# Patient Record
Sex: FEMALE | Race: WHITE | Employment: OTHER | URBAN - NONMETROPOLITAN AREA
[De-identification: names, ages, dates, MRNs, and addresses within clinical notes are randomized per-mention and may not be internally consistent; named-entity substitution may affect disease eponyms.]

---

## 2017-01-01 ENCOUNTER — COMMUNICATION - GICH (OUTPATIENT)
Dept: FAMILY MEDICINE | Facility: OTHER | Age: 82
End: 2017-01-01

## 2017-01-01 ENCOUNTER — HISTORY (OUTPATIENT)
Dept: FAMILY MEDICINE | Facility: OTHER | Age: 82
End: 2017-01-01

## 2017-01-01 ENCOUNTER — AMBULATORY - GICH (OUTPATIENT)
Dept: SCHEDULING | Facility: OTHER | Age: 82
End: 2017-01-01

## 2017-01-01 ENCOUNTER — HISTORY (OUTPATIENT)
Dept: EMERGENCY MEDICINE | Facility: OTHER | Age: 82
End: 2017-01-01

## 2017-01-01 ENCOUNTER — HISTORY (OUTPATIENT)
Dept: INTERNAL MEDICINE | Facility: OTHER | Age: 82
End: 2017-01-01

## 2017-01-01 ENCOUNTER — OFFICE VISIT - GICH (OUTPATIENT)
Dept: INTERNAL MEDICINE | Facility: OTHER | Age: 82
End: 2017-01-01

## 2017-01-01 ENCOUNTER — OFFICE VISIT - GICH (OUTPATIENT)
Dept: FAMILY MEDICINE | Facility: OTHER | Age: 82
End: 2017-01-01

## 2017-01-01 ENCOUNTER — COMMUNICATION - GICH (OUTPATIENT)
Dept: PHYSICAL THERAPY | Facility: OTHER | Age: 82
End: 2017-01-01

## 2017-01-01 ENCOUNTER — COMMUNICATION - GICH (OUTPATIENT)
Dept: INTERNAL MEDICINE | Facility: OTHER | Age: 82
End: 2017-01-01

## 2017-01-01 ENCOUNTER — HOSPITAL ENCOUNTER (OUTPATIENT)
Dept: RADIOLOGY | Facility: OTHER | Age: 82
End: 2017-11-28
Attending: NURSE PRACTITIONER

## 2017-01-01 DIAGNOSIS — F51.04 PSYCHOPHYSIOLOGIC INSOMNIA: ICD-10-CM

## 2017-01-01 DIAGNOSIS — M94.9 DISORDER OF CARTILAGE: ICD-10-CM

## 2017-01-01 DIAGNOSIS — I10 ESSENTIAL (PRIMARY) HYPERTENSION: ICD-10-CM

## 2017-01-01 DIAGNOSIS — M89.9 DISORDER OF BONE: ICD-10-CM

## 2017-01-01 DIAGNOSIS — R29.6 REPEATED FALLS: ICD-10-CM

## 2017-01-01 DIAGNOSIS — K59.1 FUNCTIONAL DIARRHEA: ICD-10-CM

## 2017-01-01 DIAGNOSIS — M54.50 LOW BACK PAIN: ICD-10-CM

## 2017-01-01 DIAGNOSIS — G25.81 RESTLESS LEGS SYNDROME: ICD-10-CM

## 2017-01-01 DIAGNOSIS — G47.00 INSOMNIA: ICD-10-CM

## 2017-01-01 DIAGNOSIS — R60.9 EDEMA: ICD-10-CM

## 2017-01-01 DIAGNOSIS — K30 FUNCTIONAL DYSPEPSIA: ICD-10-CM

## 2017-01-01 DIAGNOSIS — R05.9 COUGH: ICD-10-CM

## 2017-01-01 DIAGNOSIS — I35.0 NONRHEUMATIC AORTIC VALVE STENOSIS: ICD-10-CM

## 2017-01-01 DIAGNOSIS — H54.3 UNQUALIFIED VISUAL LOSS, BOTH EYES: ICD-10-CM

## 2017-01-01 DIAGNOSIS — I50.1 LEFT VENTRICULAR FAILURE (H): ICD-10-CM

## 2017-01-01 DIAGNOSIS — E11.9 TYPE 2 DIABETES MELLITUS WITHOUT COMPLICATIONS (H): ICD-10-CM

## 2017-01-01 DIAGNOSIS — G89.29 OTHER CHRONIC PAIN: ICD-10-CM

## 2017-01-01 DIAGNOSIS — R68.89 OTHER GENERAL SYMPTOMS AND SIGNS: ICD-10-CM

## 2017-01-01 DIAGNOSIS — R53.1 WEAKNESS: ICD-10-CM

## 2017-01-01 DIAGNOSIS — M24.542 CONTRACTURE OF JOINT OF LEFT HAND: ICD-10-CM

## 2017-01-01 DIAGNOSIS — F01.50 VASCULAR DEMENTIA WITHOUT BEHAVIORAL DISTURBANCE (H): ICD-10-CM

## 2017-01-01 DIAGNOSIS — F41.1 GENERALIZED ANXIETY DISORDER: ICD-10-CM

## 2017-01-01 DIAGNOSIS — G24.3 SPASMODIC TORTICOLLIS: ICD-10-CM

## 2017-01-01 LAB
A/G RATIO - HISTORICAL: 0.9 (ref 1–2)
ABSOLUTE BASOPHILS - HISTORICAL: 0.2 THOU/CU MM
ABSOLUTE EOSINOPHILS - HISTORICAL: 0.8 THOU/CU MM
ABSOLUTE LYMPHOCYTES - HISTORICAL: 0.7 THOU/CU MM (ref 0.9–2.9)
ABSOLUTE MONOCYTES - HISTORICAL: 1.2 THOU/CU MM
ABSOLUTE NEUTROPHILS - HISTORICAL: 7.3 THOU/CU MM (ref 1.7–7)
ALBUMIN SERPL-MCNC: 3 G/DL (ref 3.5–5.7)
ALP SERPL-CCNC: 111 IU/L (ref 34–104)
ALT (SGPT) - HISTORICAL: 5 IU/L (ref 7–52)
ANION GAP - HISTORICAL: 11 (ref 5–18)
ANION GAP - HISTORICAL: 7 (ref 5–18)
AST SERPL-CCNC: 8 IU/L (ref 13–39)
BASOPHILS # BLD AUTO: 1.8 %
BILIRUB SERPL-MCNC: 0.3 MG/DL (ref 0.3–1)
BNP SERPL-MCNC: 52 PG/ML
BUN SERPL-MCNC: 16 MG/DL (ref 7–25)
BUN SERPL-MCNC: 29 MG/DL (ref 7–25)
BUN/CREAT RATIO - HISTORICAL: 20
BUN/CREAT RATIO - HISTORICAL: 26
CALCIUM SERPL-MCNC: 11.2 MG/DL (ref 8.6–10.3)
CALCIUM SERPL-MCNC: 9.4 MG/DL (ref 8.6–10.3)
CHLORIDE SERPLBLD-SCNC: 99 MMOL/L (ref 98–107)
CHLORIDE SERPLBLD-SCNC: 99 MMOL/L (ref 98–107)
CO2 SERPL-SCNC: 25 MMOL/L (ref 21–31)
CO2 SERPL-SCNC: 25 MMOL/L (ref 21–31)
CREAT SERPL-MCNC: 0.81 MG/DL (ref 0.7–1.3)
CREAT SERPL-MCNC: 1.13 MG/DL (ref 0.7–1.3)
EOSINOPHIL NFR BLD AUTO: 8.2 %
ERYTHROCYTE [DISTWIDTH] IN BLOOD BY AUTOMATED COUNT: 11.2 % (ref 11.5–15.5)
ERYTHROCYTE [DISTWIDTH] IN BLOOD BY AUTOMATED COUNT: 12.6 % (ref 11.5–15.5)
ESTIMATED AVERAGE GLUCOSE: 134 MG/DL
GFR IF NOT AFRICAN AMERICAN - HISTORICAL: 45 ML/MIN/1.73M2
GFR IF NOT AFRICAN AMERICAN - HISTORICAL: >60 ML/MIN/1.73M2
GLOBULIN - HISTORICAL: 3.5 G/DL (ref 2–3.7)
GLUCOSE SERPL-MCNC: 107 MG/DL (ref 70–105)
GLUCOSE SERPL-MCNC: 147 MG/DL (ref 70–105)
HCT VFR BLD AUTO: 30.8 % (ref 33–51)
HCT VFR BLD AUTO: 36.8 % (ref 33–51)
HEMOGLOBIN A1C MONITORING (POCT) - HISTORICAL: 6.3 % (ref 4–6.2)
HEMOGLOBIN: 10.5 G/DL (ref 12–16)
HEMOGLOBIN: 12.7 G/DL (ref 12–16)
LYMPHOCYTES NFR BLD AUTO: 6.6 % (ref 20–44)
MCH RBC QN AUTO: 31.1 PG (ref 26–34)
MCH RBC QN AUTO: 31.2 PG (ref 26–34)
MCHC RBC AUTO-ENTMCNC: 34.2 G/DL (ref 32–36)
MCHC RBC AUTO-ENTMCNC: 34.5 G/DL (ref 32–36)
MCV RBC AUTO: 90 FL (ref 80–100)
MCV RBC AUTO: 91 FL (ref 80–100)
MONOCYTES NFR BLD AUTO: 11.5 %
NEUTROPHILS NFR BLD AUTO: 71.8 % (ref 42–72)
PLATELET # BLD AUTO: 264 THOU/CU MM (ref 140–440)
PLATELET # BLD AUTO: 330 THOU/CU MM (ref 140–440)
PMV BLD: 6.5 FL (ref 6.5–11)
PMV BLD: 9.9 FL (ref 6.5–11)
POTASSIUM SERPL-SCNC: 4.2 MMOL/L (ref 3.5–5.1)
POTASSIUM SERPL-SCNC: 4.4 MMOL/L (ref 3.5–5.1)
PROT SERPL-MCNC: 6.5 G/DL (ref 6.4–8.9)
RED BLOOD COUNT - HISTORICAL: 3.38 MIL/CU MM (ref 4–5.2)
RED BLOOD COUNT - HISTORICAL: 4.09 MIL/CU MM (ref 4–5.2)
SODIUM SERPL-SCNC: 131 MMOL/L (ref 133–143)
SODIUM SERPL-SCNC: 135 MMOL/L (ref 133–143)
WHITE BLOOD COUNT - HISTORICAL: 10.2 THOU/CU MM (ref 4.5–11)
WHITE BLOOD COUNT - HISTORICAL: 12.3 THOU/CU MM (ref 4.5–11)

## 2017-01-01 ASSESSMENT — PATIENT HEALTH QUESTIONNAIRE - PHQ9: SUM OF ALL RESPONSES TO PHQ QUESTIONS 1-9: 24

## 2017-01-05 ENCOUNTER — OFFICE VISIT - GICH (OUTPATIENT)
Dept: OBGYN | Facility: OTHER | Age: 82
End: 2017-01-05

## 2017-01-05 DIAGNOSIS — Z46.89 ENCOUNTER FOR FITTING AND ADJUSTMENT OF OTHER SPECIFIED DEVICES: ICD-10-CM

## 2017-01-06 ENCOUNTER — COMMUNICATION - GICH (OUTPATIENT)
Dept: FAMILY MEDICINE | Facility: OTHER | Age: 82
End: 2017-01-06

## 2017-01-06 ENCOUNTER — COMMUNICATION - GICH (OUTPATIENT)
Dept: OBGYN | Facility: OTHER | Age: 82
End: 2017-01-06

## 2017-01-23 ENCOUNTER — COMMUNICATION - GICH (OUTPATIENT)
Dept: FAMILY MEDICINE | Facility: OTHER | Age: 82
End: 2017-01-23

## 2017-01-23 DIAGNOSIS — F51.04 PSYCHOPHYSIOLOGIC INSOMNIA: ICD-10-CM

## 2017-02-17 ENCOUNTER — COMMUNICATION - GICH (OUTPATIENT)
Dept: FAMILY MEDICINE | Facility: OTHER | Age: 82
End: 2017-02-17

## 2017-02-17 DIAGNOSIS — G25.81 RESTLESS LEGS SYNDROME: ICD-10-CM

## 2017-02-17 DIAGNOSIS — I10 ESSENTIAL (PRIMARY) HYPERTENSION: ICD-10-CM

## 2017-02-28 ENCOUNTER — COMMUNICATION - GICH (OUTPATIENT)
Dept: FAMILY MEDICINE | Facility: OTHER | Age: 82
End: 2017-02-28

## 2017-02-28 DIAGNOSIS — I10 ESSENTIAL (PRIMARY) HYPERTENSION: ICD-10-CM

## 2017-03-06 ENCOUNTER — COMMUNICATION - GICH (OUTPATIENT)
Dept: FAMILY MEDICINE | Facility: OTHER | Age: 82
End: 2017-03-06

## 2017-03-10 ENCOUNTER — COMMUNICATION - GICH (OUTPATIENT)
Dept: FAMILY MEDICINE | Facility: OTHER | Age: 82
End: 2017-03-10

## 2017-03-10 DIAGNOSIS — G25.81 RESTLESS LEGS SYNDROME: ICD-10-CM

## 2017-03-24 ENCOUNTER — COMMUNICATION - GICH (OUTPATIENT)
Dept: FAMILY MEDICINE | Facility: OTHER | Age: 82
End: 2017-03-24

## 2017-03-24 DIAGNOSIS — K59.00 CONSTIPATION: ICD-10-CM

## 2017-03-24 DIAGNOSIS — F51.04 PSYCHOPHYSIOLOGIC INSOMNIA: ICD-10-CM

## 2017-12-27 NOTE — PROGRESS NOTES
Patient Information     Patient Name MRN Sex Melita Maldonado [id number] Female 1927      Progress Notes by Jenn Johansen NP at 2017  8:20 AM     Author:  Jenn Johansen NP Service:  (none) Author Type:  PHYS- Nurse Practitioner     Filed:  2017  9:02 AM Encounter Date:  2017 Status:  Signed     :  Jenn Johansen NP (PHYS- Nurse Practitioner)            SUBJECTIVE:    Melita Laws is a 89 y.o. female who presents for cough and to establish care    HPI  patient has had a cough for the past 2 weeks according to her son and . She lives at assisted living. They have used over-the-counter cough syrup without relief. The cough can occur if she is sitting or lying back. It does not come with meals or liquids. She has been afebrile. She has congestive heart failure and is on Lasix and Cozaar. No edema or weight gain. She is full assist with transfers. They are needing a mechanical lift ordered. Also has known hypertension that has been very well controlled. They would like to discontinue as many medications as possible. She is unable to swallow pills so her medications need to be crushed. She is on amitriptyline which she has taken for years for depression and this is working well. She sleeps well. She is also on melatonin. She has history of type 2 diabetes. Last A1c was 6.7% in 2015. She does not have sugars checked any longer. She is on a diabetic diet. They suspect she has had multiple strokes in the past. She has never really had any diagnostic studies but by her clinical history this is been observed. She does have some mild dementia.  Allergies     Allergen  Reactions     Haldol [Haloperidol] Anxiety     Beta Blockers [Beta-Blockers (Beta-Adrenergic Blocking Agts)] Nightmares     Lisinopril Cough   ,   Family History       Problem   Relation Age of Onset     Cancer-breast  Mother       age 81 of breast cancer       Cancer  Father        age 96 of unknown type of cancer       Heart Disease  Father      Silent MI       Other  Father      Dementia       Diabetes  Sister      Osteoporosis  Sister      Osteoporosis  Sister      Heart Disease  Sister      Valve repair X2       Psychiatric illness  Son      Depression       Diabetes  Other      Psychiatric illness  Neg.      Negative for anxiety       Psychiatric illness  Neg.      Negative for Dementia       Cancer-colon  Neg.      Negative for Colon-Cancer     ,   Current Outpatient Prescriptions on File Prior to Visit       Medication  Sig Dispense Refill     amitriptyline (ELAVIL) 10 mg tablet Take 1 tablet by mouth at bedtime. 30 tablet 11     cholestyramine-sucrose 4 G per scoop (QUESTRAN) 4 gram powder Take 1 Packet by mouth 2 times daily. 180 Packet 4     furosemide (LASIX) 20 mg tablet TAKE 1 TABLET BY MOUTH EVERY OTHER DAY (IN THE MORNING) 48 tablet 3     lidocaine HCl 4 % topical cream Use on patient's neck and left hand twice a day prn 1 Bottle 5     losartan (COZAAR) 50 mg tablet Take 1 tablet by mouth once daily. 90 tablet 3     medication order composer Manual wheel chair with foot pedals 1 unit 0     medication order composer 1:1:1 Diaper rash cream.1 oz ketoconazole cream,1 oz constant care cream ( or equivalent) 1 oz Maalox;  tid for 7 days 3 oz 2     Melatonin 5 mg subl TAKE 1 TABLET BY MOUTH AT BEDTIME 33 Tab 8     primidone (MYSOLINE) 50 mg tablet TAKE 1/2 TABLET BY MOUTH THREE TIMES DAILY (AM,5PM,HS) 90 tablet 5     sulindac (CLINORIL) 150 mg tablet Take 1 tablet by mouth 2 times daily with meals. 180 tablet 3     No current facility-administered medications on file prior to visit.    ,   Past Medical History:     Diagnosis  Date     Basal cell cancer, left leg 2015     inactive icd-9 diagnosis auto replaced with icd-10, display name retained//mporz      Cataract right     Cystocele      DM (diabetes mellitus) (HC)     diet controlled, since       Hx of pregnancy     G4,  P4, A0      Migraine     Aborted migraines - no pain; auras one time a year (none since 2009)      NEOPLASM, SKIN, UNCERTAIN BEHAVIOR 9/29/2011    right pinna       Spasmodic torticollis 1997    (cervical dystonia):receives  Botox injections.  (Dr. 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     TICK BITE 6/4/2012     inactive icd-9 diagnosis auto replaced with icd-10, display name retained//mporz      Tremor     seen by parkinson specialist winter 2014,     ,   Past Surgical History:      Procedure  Laterality Date     ANESTHESIA ALERT      no anesthesia problems       BREAST BIOPSY      Left benign breast biopsy       CARPAL TUNNEL RELEASE  1990s     Bilateral       CATARACT REMOVAL  2009    Left       COLONOSCOPY SCREENING  05/2002     TONSIL AND ADENOIDECTOMY       TUBAL LIGATION  1975     VAGINAL HYSTERECTOMY  2000    BSO and Cystocele repair       and   Social History        Substance Use Topics          Smoking status:   Former Smoker      Packs/day:  0.50      Years:  5.00      Types:  Cigarettes      Quit date:  1/1/1977      Smokeless tobacco:   Never Used      Alcohol use   1.2 oz/week     2 Glasses of wine per week        Comment: one glass of wine per day (2 per week)         REVIEW OF SYSTEMS:  Review of Systems   Constitutional: Negative.    HENT: Positive for congestion.    Respiratory: Positive for cough. Negative for hemoptysis, sputum production, shortness of breath and wheezing.    Cardiovascular: Negative.    Gastrointestinal: Negative for abdominal pain, diarrhea, nausea and vomiting.   Musculoskeletal:        See history of present illness   Neurological: Negative for seizures.        Clenched hands and torticollis, chronic   Psychiatric/Behavioral: Negative for depression and hallucinations. The patient does not have insomnia.        OBJECTIVE:  /70  Pulse 86  Temp 96.5  F (35.8  C) (Tympanic)  SpO2 (!) 89%    EXAM:   Physical Exam    Constitutional: She is well-developed, well-nourished, and in no distress. No distress.   HENT:   Mouth/Throat: Oropharynx is clear and moist. No oropharyngeal exudate.   Eyes: Conjunctivae are normal. No scleral icterus.   Neck: Neck supple.   Cardiovascular: Normal rate and regular rhythm.  Exam reveals no gallop and no friction rub.    Murmur heard.  Pulmonary/Chest: Effort normal and breath sounds normal.   Sounds as though she has rales at the right base. She is not taking deep inspiration. Breathing is shallow. She is not able to follow commands for deep breathing but does answer questions appropriately.   Abdominal: Soft. She exhibits no distension. There is no tenderness.   Musculoskeletal: She exhibits no edema.   Lymphadenopathy:     She has no cervical adenopathy.   Neurological: She is alert.   She is in a wheelchair and has contractures in her hands and diminished range of motion of her knees   Skin: Skin is warm. She is not diaphoretic. No pallor.   Left cheek and neck with scaly mildly erythematous base lesions which appear consistent with basal cell carcinoma. Discussed treating with cryotherapy or surgical excision and son and  preferred to monitor as these have not changed. They have been treated with cryotherapy in the past without improvement.   Psychiatric:   Answers questions appropriately, mild dementia as reflected with forgetfulness       ASSESSMENT/PLAN:    ICD-10-CM    1. Cough R05 XR CHEST 2 VIEWS PA AND LATERAL      CBC W PLT NO DIFF      BASIC METABOLIC PANEL      BNP   2. CONGESTIVE HEART FAILURE, LEFT I50.1 BNP   3. HYPERTENSION I10    4. DIABETES MELLITUS, TYPE II E11.9 Hgb A1c   5. Vascular dementia without behavioral disturbance F01.50         Plan:  1. Cough for the past 2 weeks. We'll evaluate further with chest x-ray, CBC, basic metabolic panel and be naturally peptide. History of congestive heart failure. Her coughing may be due to atelectasis as she is a shallow  breather. Will evaluate and treat further as indicated. 2. Blood pressure well controlled. We'll discontinue the amlodipine 2.5 mg daily but continue the Cozaar. Can always increase the Cozaar if blood pressure rises. Most recent renal function was excellent. 3. Continue with diabetic diet. Check A1c today. 4. She is full assist with transfers and repositioning. Mechanical lift ordered and paperwork completed that was sent in by assisted living. 5. We'll discontinue Ocuvite eye vitamins, calcium, amlodipine and can consider discontinuing the melatonin if she is sleeping well.

## 2017-12-28 NOTE — TELEPHONE ENCOUNTER
Patient Information     Patient Name MRN Melita Hu [id number] Female 1927      Telephone Encounter by Mindy Jaeger RN at 2017  8:57 AM     Author:  Mindy Jaeger RN Service:  (none) Author Type:  NURS- Registered Nurse     Filed:  2017  9:27 AM Encounter Date:  2017 Status:  Signed     :  Mindy Jaeger RN (NURS- Registered Nurse)            Medication was discontinued per PCP note 2017.  Unable to complete prescription refill per RN Medication Refill Policy.................... Mindy Jaeger RN ....................  2017   9:27 AM

## 2017-12-28 NOTE — TELEPHONE ENCOUNTER
Patient Information     Patient Name MRN Melita Hu [id number] Female 1927      Telephone Encounter by Jesusita Castro at 2017 12:26 PM     Author:  Jesusita Castro Service:  (none) Author Type:  (none)     Filed:  2017 12:28 PM Encounter Date:  2017 Status:  Signed     :  Jesusita Castro            Notified Zoë HORAN at [facility] that Rx was faxed to [facility].  Jesusita Castro

## 2017-12-28 NOTE — TELEPHONE ENCOUNTER
Patient Information     Patient Name MRN Sex Melita Maldonado [id number] Female 1927      Telephone Encounter by Jenn Johansen NP at 2017 12:18 PM     Author:  Jenn Johansen NP Service:  (none) Author Type:  PHYS- Nurse Practitioner     Filed:  2017 12:19 PM Encounter Date:  2017 Status:  Signed     :  Jenn Johansen NP (PHYS- Nurse Practitioner)            Could try robitussin with codeine

## 2017-12-28 NOTE — TELEPHONE ENCOUNTER
Patient Information     Patient Name MRN Sex Melita Maldonado [id number] Female 1927      Telephone Encounter by Ivonne Gabriel LPN at 2017  8:24 AM     Author:  Ivonne Gabriel LPN Service:  (none) Author Type:  NURS- Licensed Practical Nurse     Filed:  2017  8:26 AM Encounter Date:  2017 Status:  Signed     :  Ivonne Gabriel LPN (NURS- Licensed Practical Nurse)            Vanessa was notified of the patient's xray and lab results but is wondering if there is anything they can get for the patient's cough. Stated that they have given her OTC cough medications but it does not help. Please address.  Ivonne Gabriel LPN.........2017   8:25 AM

## 2017-12-30 NOTE — NURSING NOTE
Patient Information     Patient Name MRN Sex Melita Maldonado [id number] Female 1927      Nursing Note by Ivonne Gabriel LPN at 2017  8:20 AM     Author:  Ivonne Gabriel LPN Service:  (none) Author Type:  NURS- Licensed Practical Nurse     Filed:  2017  8:28 AM Encounter Date:  2017 Status:  Signed     :  Ivonne Gabriel LPN (NURS- Licensed Practical Nurse)            Melita Lwas is a 89 y.o. female here today for a cough that has been ongoing for the last week. Family states that it is not going away.  Patient unable to answer PHQ and JAKY,safety questions answered by family.  Ivonne Gabriel LPN.........2017   8:22 AM

## 2018-01-01 ENCOUNTER — TELEPHONE (OUTPATIENT)
Dept: INTERNAL MEDICINE | Facility: OTHER | Age: 83
End: 2018-01-01

## 2018-01-01 ENCOUNTER — DOCUMENTATION ONLY (OUTPATIENT)
Dept: FAMILY MEDICINE | Facility: OTHER | Age: 83
End: 2018-01-01

## 2018-01-01 ENCOUNTER — MEDICAL CORRESPONDENCE (OUTPATIENT)
Dept: HEALTH INFORMATION MANAGEMENT | Facility: OTHER | Age: 83
End: 2018-01-01

## 2018-01-01 VITALS — TEMPERATURE: 98.2 F | SYSTOLIC BLOOD PRESSURE: 144 MMHG | DIASTOLIC BLOOD PRESSURE: 76 MMHG | HEART RATE: 68 BPM

## 2018-01-01 VITALS
DIASTOLIC BLOOD PRESSURE: 70 MMHG | TEMPERATURE: 96.5 F | HEART RATE: 86 BPM | SYSTOLIC BLOOD PRESSURE: 130 MMHG | OXYGEN SATURATION: 89 %

## 2018-01-01 VITALS — TEMPERATURE: 97.3 F | HEART RATE: 84 BPM | DIASTOLIC BLOOD PRESSURE: 82 MMHG | SYSTOLIC BLOOD PRESSURE: 124 MMHG

## 2018-01-01 VITALS — DIASTOLIC BLOOD PRESSURE: 70 MMHG | HEART RATE: 80 BPM | SYSTOLIC BLOOD PRESSURE: 116 MMHG

## 2018-01-01 DIAGNOSIS — G24.3 SPASMODIC TORTICOLLIS: Primary | ICD-10-CM

## 2018-01-01 RX ORDER — FUROSEMIDE 20 MG
TABLET ORAL
COMMUNITY
Start: 2017-01-01

## 2018-01-01 RX ORDER — SULINDAC 150 MG/1
150 TABLET ORAL 2 TIMES DAILY WITH MEALS
COMMUNITY
Start: 2017-01-01

## 2018-01-01 RX ORDER — CHOLESTYRAMINE 4 G/9G
1 POWDER, FOR SUSPENSION ORAL 2 TIMES DAILY
COMMUNITY
Start: 2017-01-01

## 2018-01-01 RX ORDER — PRIMIDONE 50 MG/1
TABLET ORAL
COMMUNITY
Start: 2017-01-01

## 2018-01-01 RX ORDER — LOSARTAN POTASSIUM 50 MG/1
50 TABLET ORAL DAILY
COMMUNITY
Start: 2017-01-01

## 2018-01-01 RX ORDER — CODEINE PHOSPHATE/GUAIFENESIN 10-100MG/5
5 LIQUID (ML) ORAL EVERY 6 HOURS PRN
COMMUNITY
Start: 2017-01-01

## 2018-01-01 RX ORDER — LIDOCAINE HCL 4% 4 G/100G
CREAM TOPICAL
COMMUNITY
Start: 2017-01-01

## 2018-01-01 RX ORDER — AMITRIPTYLINE HYDROCHLORIDE 10 MG/1
10 TABLET ORAL AT BEDTIME
COMMUNITY
Start: 2017-01-01

## 2018-01-01 ASSESSMENT — PATIENT HEALTH QUESTIONNAIRE - PHQ9: SUM OF ALL RESPONSES TO PHQ QUESTIONS 1-9: 24

## 2018-01-02 NOTE — TELEPHONE ENCOUNTER
Patient Information     Patient Name MRN Melita Hu [id number] Female 1927      Telephone Encounter by Trang Luo at 2017  1:20 PM     Author:  Trang Luo Service:  (none) Author Type:  (none)     Filed:  2017  1:22 PM Encounter Date:  2017 Status:  Signed     :  Trang Luo            Fax from [facility] regarding patients Pessary fell out last pm.  Per Wicho Huang okay to leave Pessary out for two weeks.  If patient requests to have it put back in, will schedule appointment with Dr. Huang.  Per verbal order of Dr. Huang.  Jennifer Luo LPN ....................  2017   1:22 PM

## 2018-01-02 NOTE — TELEPHONE ENCOUNTER
Patient Information     Patient Name MRN Sex Melita Maldonado [id number] Female 1927      Telephone Encounter by Lelia Rodriguez at 2017  2:57 PM     Author:  Lelia Rodriguez Service:  (none) Author Type:  (none)     Filed:  2017  2:58 PM Encounter Date:  2017 Status:  Signed     :  Lelia Rodriguez             notified.  Lelia Rodriguez LPN....................2017 2:57 PM

## 2018-01-02 NOTE — TELEPHONE ENCOUNTER
Patient Information     Patient Name MRN Sex Melita Maldonado [id number] Female 1927      Telephone Encounter by Lelia Rodriguez at 2017  2:52 PM     Author:  Lelia Rodriguez Service:  (none) Author Type:  (none)     Filed:  2017  2:55 PM Encounter Date:  2017 Status:  Signed     :  Lelia Rodriguez            Left message to call back  Lelia Rodriguez LPN ...................  2017   2:53 PM

## 2018-01-02 NOTE — TELEPHONE ENCOUNTER
Patient Information     Patient Name MRN Sex Melita Maldonado [id number] Female 1927      Telephone Encounter by Esme Zarco MD at 2017 12:45 PM     Author:  Esme Zarco MD Service:  (none) Author Type:  Physician     Filed:  2017 12:46 PM Encounter Date:  2017 Status:  Signed     :  Esme Zarco MD (Physician)            Obviously the 500 mg is fine.

## 2018-01-02 NOTE — PROGRESS NOTES
Patient Information     Patient Name MRN Sex Melita Maldonado [id number] Female 1927      Progress Notes by Bora Huang MD at 2017  2:00 PM     Author:  Bora Huang MD Service:  (none) Author Type:  Physician     Filed:  2017  2:43 PM Encounter Date:  2017 Status:  Signed     :  Bora Huang MD (Physician)            PESSARY CARES    Melita Lawsreturns today for routine pessary cares. She had her pessary left out for a few weeks to allow an erosion to heal. She would like it reinserted today. She is present with her .    Past Medical History      Diagnosis   Date     Basal cell cancer, left leg  2015      inactive icd-9 diagnosis auto replaced with icd-10, display name retained//mporz      Cataract  right     Cystocele       DM (diabetes mellitus) (HC)       diet controlled, since       Hx of pregnancy       G4, P4, A0      Migraine       Aborted migraines - no pain; auras one time a year (none since )      NEOPLASM, SKIN, UNCERTAIN BEHAVIOR  2011     right pinna       Spasmodic torticollis       (cervical dystonia):receives  Botox injections.  (Dr. Gene Martin at 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  2015     TICK BITE  2012      inactive icd-9 diagnosis auto replaced with icd-10, display name retained//mporz      Tremor       seen by parkinson specialist winter 2014,         Past Surgical History       Procedure   Laterality Date     Tonsil and adenoidectomy        Breast biopsy        Left benign breast biopsy       Tubal ligation        Carpal tunnel release         Bilateral       Vaginal hysterectomy        BSO and Cystocele repair        Colonoscopy screening   2002     Cataract removal   2009     Left       Anesthesia alert        no anesthesia problems         REVIEW OF SYSTEMS:  A comprehensive review of systems was negative.      Visit Vitals       /76     Pulse 68      "Temp 98.2  F (36.8  C) (Tympanic)    There is no height or weight on file to calculate BMI.    On exam, speculum exam is completed with no abnormalities identified. Healed erosion is seen.  The 2 1/2\" pessary is lubricated with Trimosan and replaced into proper position.  The patient tolerated the procedure well. Upon insertion it seemed no longer to fit properly and was replaced with a 2 1/4\" Gelhorn. This appeared to be a better fit.    She will return in 8 weeks for routine pessary cares.    (Z46.89) Pessary maintenance  (primary encounter diagnosis)  Comment:   Plan: HI FIT/INSERT INTRAVAG SUPPORT DEVICE          Bora Huang MD FACOG  2:43 PM 1/5/2017                 "

## 2018-01-02 NOTE — TELEPHONE ENCOUNTER
Patient Information     Patient Name MRN Melita Hu [id number] Female 1927      Telephone Encounter by Marlin Whitman at 2017 12:42 PM     Author:  Marlin Whitman Service:  (none) Author Type:  (none)     Filed:  2017 12:44 PM Encounter Date:  2017 Status:  Signed     :  Marlin Whitman            Patient's  bought 500 mg calcium, Melita is prescribed 600 mg calcium. The caregivers where she is staying will not administer it with out a doctors order. Her  is wondering if she has to be on 600 mg's of calcium or can it be the 500 mg?  Marlin Whitman LPN.......................... 2017  12:44 PM

## 2018-01-03 NOTE — TELEPHONE ENCOUNTER
Patient Information     Patient Name MRN Melita Hu [id number] Female 1927      Telephone Encounter by Leigh Rocwkell RN at 3/13/2017  8:42 AM     Author:  Leigh Rockwell RN Service:  (none) Author Type:  (none)     Filed:  3/13/2017  8:49 AM Encounter Date:  3/10/2017 Status:  Signed     :  Leigh Rockwell RN (NURS- Registered Nurse)            Parkinsons    Office visit in the past 12 months or per provider note.    Last visit with PETR KURTZ was on: 2016 in [facility]  Next visit with PETR KURTZ is on: No future appointment listed with this provider  Next visit with Family Practice is on: No future appointment listed in this department    Max refill for 12 months from last office visit or per provider note.    Patient is due for medication management appointment. Limited refill provided at this time and letter sent for reminder to patient. Prescription refilled per RN Medication Refill Policy.................... Leigh Rockwell ....................  3/13/2017   8:47 AM

## 2018-01-03 NOTE — TELEPHONE ENCOUNTER
Patient Information     Patient Name MRN Melita Hu [id number] Female 1927      Telephone Encounter by Leigh Rockwell RN at 3/6/2017 11:31 AM     Author:  Leigh Rockwell RN Service:  (none) Author Type:  (none)     Filed:  3/6/2017 11:33 AM Encounter Date:  3/6/2017 Status:  Signed     :  Leigh Rockwell RN (NURS- Registered Nurse)            Patient's caregiver calling regarding cozaar. Caregiver unavailable, message left with front office staff to call back.  Leigh Rockwell RN............. 3/6/2017 11:33 AM

## 2018-01-03 NOTE — TELEPHONE ENCOUNTER
Patient Information     Patient Name MRN Melita Hu [id number] Female 1927      Telephone Encounter by Maryann Dockery at 2017 11:04 AM     Author:  Maryann Dockery Service:  (none) Author Type:  NURS- Registered Nurse     Filed:  2017 11:17 AM Encounter Date:  2017 Status:  Signed     :  Maryann Dockery (NURS- Registered Nurse)            Angiotensin Receptor Blockers (ARB)    Office visit in the past 12 months or per provider note.    Last visit with PETR KURTZ was on: 2016 in [facility]  Next visit with PETR KURTZ is on: No future appointment listed with this provider  Next visit with Family Practice is on: No future appointment listed in this department    Lab test requirements:  Creatinine and Potassium annually, if ordering lab, order BMP.  CREATININE (mg/dL)    Date Value   2016 1.53 (H)     POTASSIUM (mmol/L)    Date Value   2016 4.8       Max refill for 12 months from last office visit or per provider note    Parkinsons    Office visit in the past 12 months or per provider note.    Last visit with PETR KURTZ was on: 2016 in [facility]  Next visit with PETR KURTZ is on: No future appointment listed with this provider  Next visit with Family [facility] is on: No future appointment listed in this department    Max refill for 12 months from last office visit or per provider note.    Primidone and Pramipexole was refilled today. Can be addressed at next visit.      Patient is due for medication management appointment. Limited refill provided at this time and letter sent for reminder to patient. Prescription refilled per RN Medication Refill Policy.................... Maryann Dockery RN ....................  2017   11:16 AM

## 2018-01-03 NOTE — TELEPHONE ENCOUNTER
Patient Information     Patient Name MRN Odessa Hu [id number] Female 1927      Telephone Encounter by Marlin Whitman at 2017 11:41 AM     Author:  Marlin Whitman Service:  (none) Author Type:  (none)     Filed:  2017 11:43 AM Encounter Date:  2017 Status:  Signed     :  Marlin Whitman            Patient's  received a letter that she will need to be seen to get a full refill of her Cozaar. Patient's caregiver states that odessa is becoming harder to transfer in and out of the car. She is wondering if a full refill could be supplied?  Marlin Whitman LPN.......................... 2017  11:42 AM

## 2018-01-03 NOTE — TELEPHONE ENCOUNTER
Patient Information     Patient Name MRN Melita Hu [id number] Female 1927      Telephone Encounter by Leigh Rockwell RN at 2017  4:20 PM     Author:  Leigh Rockwell RN Service:  (none) Author Type:  (none)     Filed:  2017  4:21 PM Encounter Date:  2017 Status:  Signed     :  Leigh Rockwell RN (NURS- Registered Nurse)            Controlled medication - was filled today. Refill requested too soon.   Unable to complete prescription refill per RN Medication Refill Policy.................... Leigh Rockwell ....................  2017   4:20 PM

## 2018-01-03 NOTE — TELEPHONE ENCOUNTER
Patient Information     Patient Name MRN Melita Hu [id number] Female 1927      Telephone Encounter by Leigh Rockwell RN at 3/13/2017  8:43 AM     Author:  Leigh Rockwell RN Service:  (none) Author Type:  (none)     Filed:  3/13/2017  8:49 AM Encounter Date:  3/10/2017 Status:  Signed     :  Leigh Rockwell RN (NURS- Registered Nurse)            This is a Refill request from: [facility]  Name of Medication: Primidone  Quantity requested: 46.5  Last fill date: 12/15/2016  Due for refill: Yes  [facility]  PCP:  Esme Zarco MD  Controlled Substance Agreement:  N/A   Diagnosis r/t this medication request: Restless Legs    Patient is due for medication management appointment. Letter sent for reminder to patient.     Unable to complete prescription refill per RN Medication Refill Policy.................... Leigh Rockwell ....................  3/13/2017   8:43 AM

## 2018-01-03 NOTE — TELEPHONE ENCOUNTER
Patient Information     Patient Name MRN Sex Melita Maldonado [id number] Female 1927      Telephone Encounter by Esme Zarco MD at 2017 12:27 PM     Author:  Esme Zarco MD Service:  (none) Author Type:  Physician     Filed:  2017 12:27 PM Encounter Date:  2017 Status:  Signed     :  Esme Zarco MD (Physician)            Okay to fill for a year.

## 2018-01-03 NOTE — TELEPHONE ENCOUNTER
Patient Information     Patient Name MRN Melita Hu [id number] Female 1927      Telephone Encounter by Leigh Rockwell RN at 3/6/2017 11:36 AM     Author:  Leigh Rockwell RN Service:  (none) Author Type:  (none)     Filed:  3/6/2017 11:37 AM Encounter Date:  3/6/2017 Status:  Signed     :  Leigh Rockwell RN (NURS- Registered Nurse)            Caregiver was calling as she never received a return call from 2017 telephone note regarding refills. Was wondering if this was completed. Caregiver notified that prescriptions should all be up-to-date and should have no trouble receiving refills of each.  Leigh Rockwell RN............. 3/6/2017 11:37 AM

## 2018-01-04 NOTE — PATIENT INSTRUCTIONS
Patient Information     Patient Name MRN Melita Hu [id number] Female 1927      Patient Instructions by Esme Zarco MD at 3/28/2017 11:51 AM     Author:  Esme Zarco MD  Service:  (none) Author Type:  Physician     Filed:  3/29/2017  7:23 PM  Encounter Date:  3/28/2017 Status:  Addendum     :  Esme Zarco MD (Physician)        Related Notes: Original Note by Esme Zarco MD (Physician) filed at 3/28/2017 11:53 AM            1.  Labs will sent to your home  2.  Decrease lasix to every other day.   3.  If tolerate and/ or lab shows then will need to consider discontinuing  4.  Ear wax in right ear; - irrigated by nursing staff  5.  Stop mirapex  6.   Stop trazodone  7.  Okay to use 5 mg of melatonin a day.   8.  Wheel chair of chronic deconditioning  9.  Falls risk precautions  10.  Continue celexa for now. But will consider changing to remeron to help with anxiety, mood, sleep , appetitie  But would like to see how she does with adjustments /  off of lasix, trazodone, mirpex and added melatonin   11.  Recommend ensure/ carnation drink twice a day with meals.              Index Georgian   Mirtazapine, Oral   [id number]  ________________________________________________________________________  KEY POINTS    This medicine is taken by mouth to treat depression. Take it exactly as directed.    This medicine may increase suicidal thoughts or actions in some people.    This medicine may cause unwanted side effects. Tell your healthcare provider if you have any side effects that are serious, continue, or get worse.    This medicine may cause life-threatening problems if you take this medicine with certain other medicines. Tell all healthcare providers who treat you about all the prescription medicines, nonprescription medicines, supplements, natural remedies, and vitamins that you  take.  ________________________________________________________________________  What are other names for this medicine?   Type of medicine: antidepressant  Generic and brand names: mirtazapine, oral; Remeron; Remeron SolTab  What is this medicine used for?  This medicine is taken by mouth to treat depression.  This medicine may be used to treat other conditions as determined by your healthcare provider.  What should my healthcare provider know before I take this medicine?   Before taking this medicine, tell your healthcare provider if you have ever had:    An allergic reaction to any medicine    A stroke or transient ischemic attack (TIA)    Glaucoma    Heart disease or a heart attack    High cholesterol or high triglycerides    High or low blood pressure    Liver or kidney disease    Low levels of sodium in the blood    Mental health problems such as bipolar disorder, paranoia, or schizophrenia    Phenylketonuria (PKU). The SolTab contains phenylalanine.    Seizures    Thoughts of suicide  Do not take this medicine if you have taken an MAO inhibitor such as isocarboxazid (Marplan), phenelzine (Nardil), selegiline, or tranylcypromine (Parnate) within the last 14 days.  Females of childbearing age: Tell your healthcare provider if you are pregnant or plan to become pregnant. It is not known whether this medicine will harm an unborn baby. Do not breast-feed while taking this medicine without your healthcare provider's approval.  How do I take it?   Read the Medication Guide that comes in the medicine package when you start taking this medicine and each time you get a refill.  Check the label on the medicine for directions about your specific dose. Take this medicine exactly as your healthcare provider prescribes. Do not take more of it or take it longer than prescribed. Do not stop taking this medicine without your healthcare provider's approval. You may have to reduce your dosage gradually. Stopping too quickly may  cause withdrawal symptoms.  Check with your healthcare provider before using this medicine in children under age 18.  Usually you will take your daily dose at bedtime because this medicine may make you drowsy.  If you have the dissolving tablet form of this medicine, open the blister pack with dry hands, place the tablet on your tongue immediately, and let the tablet dissolve. Water is NOT needed to take the tablet. Do not crush, break, or chew the dissolving tablet.  What if I miss a dose?  If you miss a dose, take it as soon as you remember unless it is almost time for the next scheduled dose. In that case, skip the missed dose and take the next one as directed. Do not take double doses. If you are not sure of what to do if you miss a dose, or if you miss more than one dose, contact your healthcare provider. Do not stop taking this medicine without your healthcare provider's approval. You may need to reduce your dose slowly to avoid withdrawal symptoms.  What if I overdose?  If you or anyone else has intentionally taken too much of this medicine, call 911 or go to the emergency room right away. If you pass out, have seizures, weakness or confusion, or have trouble breathing, call 911. If you think that you or anyone else may have taken too much of this medicine, call the poison control center. Do this even if there are no signs of discomfort or poisoning. The poison control center number is [phone].  Symptoms of an acute overdose may include: confusion, memory problems, drowsiness, fast heartbeat.  What should I watch out for?   Antidepressant medicines may increase suicidal thoughts or actions in some children, teenagers, and young adults within the first few months of treatment or if your dose changes. Talk with your provider about this.  Behavior changes may be caused by the medicine or by depression or another mental health problem. Contact your provider right away if you or your family notice any  disturbing changes in your thoughts or behavior, such as:    More outgoing or aggressive behavior than normal    Confusion    Hallucinations    Worsening of depression    Suicidal thoughts  This medicine may trigger angle-closure glaucoma. Contact your provider right away if you have eye pain, vision changes, or redness and swelling in or around your eye.  It may take several weeks before you start to feel better. Do not stop taking this medicine unless your healthcare provider tells you to do so. You may have withdrawal symptoms if you stop this medicine abruptly.  This medicine increases the effects of alcohol and other drugs that slow down your nervous system. Do not drink alcohol or take other medicines unless your healthcare provider approves.  This medicine may make you dizzy or drowsy or cause blurred vision. Do not drive or operate machinery unless you are fully alert.  You may feel dizzy or faint when you get up quickly after sitting or lying down. Getting up slowly may help.  If you need emergency care, surgery, lab tests, or dental work, tell the healthcare provider or dentist you are taking this medicine.  Adults over the age of 65 may be at greater risk for side effects. Talk with your healthcare provider about this.  This medicine may cause a life-threatening problem called serotonin syndrome if you take it with certain other medicines, such as antidepressants, migraine medicines, pain medicines, some cough medicines, and Jem s wort. Make sure that your providers know ALL of the medicines that you take. Contact your healthcare provider right away if you have:    Restlessness    Loss of coordination    Fast heart beat    Rapid changes in blood pressure    Increased body temperature    Nausea    Vomiting    Diarrhea  This medicine may increase your blood level of cholesterol or triglycerides. Talk to your healthcare provider about this.  What are the possible side effects?   Along with its needed  effects, your medicine may cause some unwanted side effects. Some side effects may be very serious. Some side effects may go away as your body adjusts to the medicine. Tell your healthcare provider if you have any side effects that continue or get worse.  Life-threatening (Report these to your healthcare provider right away. If you are unable to reach your healthcare provider right away, get emergency medical care or call 911 for help): Allergic reaction (hives; itching; rash; trouble breathing; tightness in your chest; swelling of your lips, tongue, and throat).  Serious (Report these to your healthcare provider right away.): Chest pain; chills; dark urine; fast or irregular heartbeat; hallucinations; heavy sweating; high fever; fainting; loss of bladder control; mouth sores; muscle or joint pain; new or sudden changes in mood, behaviors, thoughts, or feelings; numbness or tingling in the hands or feet; persistent headache; prolonged erection; rash; seizures; severe drowsiness; severe muscle stiffness; severe restlessness or panic attacks; thoughts of suicide; trouble concentrating or memory problems; trouble urinating; trouble walking or loss of balance; twitching or involuntary movement of your body or face; unexplained sore throat; unusual bruising or bleeding; unusual excitement; unusual tiredness or weakness; worsening depression; yellowing of your eyes or skin.  Other: Abnormal dreams; bloating; change in sense of taste; change in sexual ability or desire; constipation; diarrhea; mild dizziness; mild drowsiness; dry mouth; mild headache; increased appetite; nausea; trouble sleeping; vision problems; weight gain or loss.  What products might interact with this medicine?   When you take this medicine with other medicines, it can change the way this or any of the other medicines work. Nonprescription medicines, vitamins, natural remedies, and certain foods may also interact. Using these products together might  cause harmful side effects. Talk to your healthcare provider if you are taking:    Abiraterone (Zytiga)    ACE inhibitors such as benazepril (Lotensin), captopril, enalapril (Vasotec), fosinopril, lisinopril (Prinivil, Zestril), moexipril (Univasc), quinapril (Accupril), and ramipril (Altace)    Alpha blockers such as alfuzosin (Uroxatral), doxazosin (Cardura), prazosin (Minipress), silodosin (Rapaflo), tamsulosin (Flomax), and terazosin    Angiotensin receptor blockers (ARBs) such as azilsartan (Edarbi), candesartan (Atacand), eprosartan (Teveten), irbesartan (Avapro), losartan (Cozaar), olmesartan (Benicar), telmisartan (Micardis), and valsartan (Diovan)    Antianxiety medicines such as alprazolam (Xanax), chlordiazepoxide, clonazepam (Klonopin), diazepam (Valium), lorazepam (Ativan), and triazolam (Halcion)    Antiarrhythmic medicines (to treat irregular heartbeat) such as amiodarone (Cordarone, Pacerone), dronedarone (Multaq), mexiletine, and quinidine    Antibiotics such as azithromycin (Zithromax, Zmax), ciprofloxacin (Cipro), clarithromycin (Biaxin), erythromycin (E.E.S., Saleem-Tab, Erythrocin), isoniazid, levofloxacin (Levaquin), linezolid (Zyvox), metronidazole, moxifloxacin (Avelox), rifabutin (Mycobutin), rifampin (Rifadin), and telithromycin (Ketek)    Antidepressants such as amitriptyline, amoxapine, citalopram (Celexa), clomipramine (Anafranil), desipramine (Norpramin), desvenlafaxine (Pristiq), duloxetine (Cymbalta), escitalopram (Lexapro), fluoxetine (Prozac), fluvoxamine (Luvox), imipramine (Tofranil), levomilnacipran (Fetzima), nefazodone, nortriptyline (Pamelor), protriptyline (Vivactil), sertraline (Zoloft), trimipramine (Surmontil), venlafaxine (Effexor), vilazodone (Viibryd), and vortioxetine (Trintellix)    Antifungal medicines such as fluconazole (Diflucan), itraconazole (Sporanox), ketoconazole (Nizoral), posaconazole (Noxafil), and voriconazole (Vfend)    Antihistamines such as  chlorpheniramine (Chlor-Trimeton), clemastine (Tavist), diphenhydramine (Benadryl), and hydroxyzine (Vistaril)    Antipsychotic medicines such as aripiprazole (Abilify), asenapine (Saphris), brexpiprazole (Rexulti), chlorpromazine, clozapine (Clozaril, FazaClo), fluphenazine, haloperidol (Haldol), iloperidone (Fanapt), loxapine (Loxitane), lurasidone (Latuda), olanzapine (Zyprexa), paliperidone (Invega), perphenazine, pimozide (Orap), quetiapine (Seroquel), risperidone (Risperdal), thioridazine, trifluoperazine, and ziprasidone (Geodon)    Antiseizure medicines such as carbamazepine (Carbatrol, Epitol, Equetro, Tegretol), felbamate (Felbatol), gabapentin (Neurontin), lamotrigine (Lamictal), levetiracetam (Keppra), oxcarbazepine (Trileptal), phenytoin (Dilantin, Phenytek), primidone (Mysoline), tiagabine (Gabitril), topiramate (Qudexy, Topamax, Trokendi), and valproic acid (Depacon, Depakene, Depakote)    Barbiturates such as butabarbital (Butisol), pentobarbital (Nembutal), phenobarbital, and secobarbital (Seconal)    Beta blockers such as acebutolol (Sectral), atenolol (Tenormin), bisoprolol (Zebeta), carvedilol (Coreg), labetalol (Trandate), metoprolol (Lopressor, Toprol), nadolol (Corgard), nebivolol (Bystolic), pindolol, and sotalol (Betapace, Sorine)    Bosentan (Tracleer)    Bupropion (Aplenzin, Forfivo, Wellbutrin, Buproban, Zyban)    Buspirone    Calcium channel blockers such as amlodipine (Norvasc), amlodipine/atorvastatin (Caduet), diltiazem (Cardizem, Cartia, Tiazac), felodipine, isradipine (DynaCirc), nicardipine (Cardene), nifedipine (Adalat CC, Procardia), nisoldipine (Sular), and verapamil (Calan, Covera, Verelan)    Cimetidine (Tagamet)    Cough, cold, or allergy medicines and decongestants    Cyclosporine (Gengraf, Neoral, Sandimmune)    Dextromethorphan, an ingredient in many cough, cold, or allergy medicines such as Robitussin-DM    Dextromethorphan/quinidine (Nuedexta)    Doxepin (Silenor)    HIV  medicines such as atazanavir (Reyataz), darunavir (Prezista), delavirdine (Rescriptor), efavirenz (Sustiva), emtricitabine (Emtriva), etravirine (Intelence), fosamprenavir (Lexiva), indinavir (Crixivan), lopinavir/ritonavir (Kaletra), maraviroc (Selzentry), nelfinavir (Viracept), nevirapine (Viramune), rilpivirine (Edurant), ritonavir (Norvir), saquinavir (Invirase), stavudine (Zerit), and tipranavir (Aptivus)    Imatinib (Gleevec)    Lithium (Lithobid)    MAO inhibitors such as isocarboxazid (Marplan), phenelzine (Nardil), selegiline (Eldepryl, Emsam, Zelapar), and tranylcypromine (Parnate) (Do not take this medicine and an MAO inhibitor within 14 days of each other.)    Medicines to treat low sodium levels such as conivaptan (Vaprisol) and tolvaptan (Samsca)    Metoclopramide (Metozolv, Reglan)    Migraine medicines such as almotriptan (Axert), dihydroergotamine (D.H.E. 45, Migranal), eletriptan (Relpax), ergotamine (Ergomar), frovatriptan (Frova), naratriptan (Amerge), rizatriptan (Maxalt), sumatriptan (Alsuma, Imitrex, Sumavel), and zolmitriptan (Zomig)    Milnacipran (Savella)    Muscle relaxants such as baclofen (Gablofen, Lioresal), carisoprodol (Soma), cyclobenzaprine (Amrix), dantrolene (Dantrium), methocarbamol (Robaxin), and tizanidine (Zanaflex)    Natural remedies such as gotu kathryn, kava, [place]'s wort, SAMe, tryptophan, and valerian    Nausea medicines such as dolasetron (Anzemet), droperidol (Inapsine), ondansetron (Zofran), prochlorperazine (Compro), and promethazine    Pain medicines such as codeine, fentanyl (Abstral, Actiq, Duragesic, Fentora, Sublimaze), hydrocodone/acetaminophen (Norco, Vicodin), hydromorphone (Dilaudid, Exalgo), meperidine (Demerol), methadone (Dolophine, Methadose), morphine (Omayra, MS Contin), morphine/naltrexone (Embeda), oxycodone (OxyContin, Roxicodone), oxycodone/acetaminophen (Percocet, Roxicet), pentazocine (Talwin), tapentadol (Nucynta), and tramadol (ConZip,  Ultram)    Parkinson s disease medicines such as bromocriptine (Cycloset, Parlodel), cabergoline, levodopa/carbidopa (Duopa, Rytary, Sinemet), entacapone (Comtan), pramipexole (Mirapex), rasagiline (Azilect), and ropinirole (Requip)    Paroxetine (Brisdelle, Paxil, Pexeva)    Procarbazine (Matulane)    Products that contain methylene blue (Hyophen, Prosed DS, Urophen, Misty)    Propranolol (Hemangeol, Inderal, InnoPran)    Sleeping pills such as eszopiclone (Lunesta), zaleplon (Sonata), and zolpidem (Ambien, Edluar, Intermezzo)    Warfarin (Coumadin)  Ask your healthcare provider or pharmacist if you need to avoid products that contain grapefruit, [place]in[place] oranges, and tangelos while you are taking this medicine. These fruits and juices can affect the way this medicine works and may increase your risk of serious side effects.  Do not drink alcohol while you are taking this medicine.  If you are not sure if your medicines might interact, ask your pharmacist or healthcare provider. Keep a list of all your medicines with you. List all the prescription medicines, nonprescription medicines, supplements, natural remedies, and vitamins that you take. Be sure that you tell all healthcare providers who treat you about all the products you are taking.   How should I store this medicine?  Store this medicine at room temperature. Keep the container tightly closed. Protect it from heat, high humidity, and bright light.    This advisory includes selected information only and may not include all side effects of this medicine or interactions with other medicines. Ask your healthcare provider or pharmacist for more information or if you have any questions.  Ask your pharmacist for the best way to dispose of outdated medicine or medicine you have not used. Do not throw medicine in the trash.  Keep all medicines out of the reach of children.   Do not share medicines with other people.   Developed by [employer]  Medication Advisor  2016.3 published by [employer].  Last modified: 2016-07-28  Last reviewed: 2016-01-11  This content is reviewed periodically and is subject to change as new health information becomes available. The information is intended to inform and educate and is not a replacement for medical evaluation, advice, diagnosis or treatment by a healthcare professional.  References   Medication Advisor 2016.3 Index    Copyright   2016 [employer], a division of [employer] All rights reserved.

## 2018-01-04 NOTE — TELEPHONE ENCOUNTER
Patient Information     Patient Name MRN Sex Melita Maldonado [id number] Female 1927      Telephone Encounter by Esme Zarco MD at 2017  5:03 PM     Author:  Esme Zaroc MD Service:  (none) Author Type:  Physician     Filed:  2017  5:05 PM Encounter Date:  2017 Status:  Signed     :  Esme Zarco MD (Physician)            No Remeron until we can see if she can successfully come down on her celexa;   So will re-evaluate  In 3 weeks when she is on 10 mg of celexa.

## 2018-01-04 NOTE — TELEPHONE ENCOUNTER
Patient Information     Patient Name MRN Melita Hu [id number] Female 1927      Telephone Encounter by Marlin Whitman at 2017  2:01 PM     Author:  Marlin Whitman Service:  (none) Author Type:  (none)     Filed:  2017  2:02 PM Encounter Date:  2017 Status:  Signed     :  Marlin Whitman            Caregiver notified, please send a Rx for Remeron 7.5 MG's to the pharmacy so that the pharmacy can update her blister packs for when she should be done with Celexa.    Marlin Whitman LPN.......................... 2017  2:01 PM

## 2018-01-04 NOTE — TELEPHONE ENCOUNTER
Patient Information     Patient Name MRN Melita Hu [id number] Female 1927      Telephone Encounter by Marlin Whitman at 2017  1:38 PM     Author:  Marlin Whitman Service:  (none) Author Type:  (none)     Filed:  2017  1:38 PM Encounter Date:  2017 Status:  Signed     :  Marlin Whitman            Pharmacy and caregiver notified.  Marlin Whitman LPN.......................... 2017  1:38 PM

## 2018-01-04 NOTE — TELEPHONE ENCOUNTER
Patient Information     Patient Name MRN Sex Odessa Maldonado [id number] Female 1927      Telephone Encounter by Marlin Whitman at 2017  2:50 PM     Author:  Marlin Whitman Service:  (none) Author Type:  (none)     Filed:  2017  2:53 PM Encounter Date:  2017 Status:  Signed     :  Marlin Whitman            Patient son has called in with a follow up from od[place]. Stopping the trazodone she stayed the same, stopping the Mirapex her restless legs have stayed the same. She was started on Melatonin and her caregiver told him that this has not helped nor did it hurt. He was wondering if she should be started on Remeron or just continue doing what they are doing?    Marlin Whitman LPN.......................... 2017  2:53 PM

## 2018-01-04 NOTE — TELEPHONE ENCOUNTER
Patient Information     Patient Name MRN Od[place] Hu [id number] Female 1927      Telephone Encounter by Esme Zarco MD at 2017  9:27 AM     Author:  Esme Zarco MD Service:  (none) Author Type:  Physician     Filed:  2017  9:28 AM Encounter Date:  2017 Status:  Signed     :  Esme Zarco MD (Physician)            Will need to taper odessa off of her celexa;  Have them decrease her celexa dose to 20 mg a day over next two weeks then decrease to 10 mg next two weeks, then will start Remeron at 7.5 mg a day and discontinue celexa.

## 2018-01-04 NOTE — TELEPHONE ENCOUNTER
Patient Information     Patient Name MRN Sex Melita Maldonado [id number] Female 1927      Telephone Encounter by Lela Thompson RN at 3/24/2017 11:47 AM     Author:  Lela Thompson RN Service:  (none) Author Type:  NURS- Registered Nurse     Filed:  3/24/2017 11:53 AM Encounter Date:  3/24/2017 Status:  Signed     :  Lela Thompson RN (NURS- Registered Nurse)            This is a Refill request from: [facility]  Name of Medication: Trazodone  Quantity requested: 30  Last fill date: 17  Due for refill: yes  Last visit with PETR ZARCO was on: 2016 in [facility]  PCP:  Petr Zarco MD  Controlled Substance Agreement:  None found   Diagnosis r/t this medication request: Psychophysiological insomnia     Unable to complete prescription refill per RN Medication Refill Policy.................... Lela Thompson RN ....................  3/24/2017   11:50 AM    Office visit in the past 12 months or per provider note.    Last visit with PETR ZARCO was on: 2016 in [facility]  Next visit with PETR ZARCO is on: 2017 in [facility]  Next visit with Family Practice is on: 2017 in [facility]    Max refill for 12 months from last office visit or per provider note.  Prescription refilled per RN Medication Refill Policy.................... Lela Thompson RN ....................  3/24/2017   11:50 AM

## 2018-01-04 NOTE — TELEPHONE ENCOUNTER
"Patient Information     Patient Name MRN Melita Hu [id number] Female 1927      Telephone Encounter by Lela Thompson RN at 2017  2:30 PM     Author:  Lela Thompson RN Service:  (none) Author Type:  NURS- Registered Nurse     Filed:  2017  2:39 PM Encounter Date:  2017 Status:  Signed     :  Lela Thompson RN (NURS- Registered Nurse)            Last OV note, 3/28/17, instructions state \"Okay to use 5 mg of melatonin a day.\"  No Rx sent.  PCP out of office this week.  Routed to Sheela Carpenter MD for consideration.    This is a Refill request from: [facility]  Name of Medication: Melatonin 5 mg  Quantity requested: 33  Last fill date: none  Last visit with ESME ZARCO was on: 2017 in [facility]  PCP:  Esme Zarco MD  Controlled Substance Agreement:  nq   Diagnosis r/t this medication request: physician consideration     Unable to complete prescription refill per RN Medication Refill Policy.................... Lela Thompson RN ....................  2017   2:33 PM          "

## 2018-01-04 NOTE — PROGRESS NOTES
Patient Information     Patient Name MRN Sex Melita Maldonado [id number] Female [id number]      Progress Notes by Esme Zarco MD at 3/28/2017 10:15 AM     Author:  Esme Zarco MD Service:  (none) Author Type:  Physician     Filed:  3/29/2017  7:27 PM Encounter Date:  3/28/2017 Status:  Signed     :  Esme Zarco MD (Physician)            Nursing Notes:   Claudia Medeiros  3/28/2017 11:13 AM  Signed  Medication management and Wheel Chair order needed  Claudia Medeiros LPN........................3/28/2017  10:42 AM         Subjective:  Melita Laws is a 89 y.o. female who presents for follow up multiple medical issues.  She is accompanied by her son and     Patient is a resident of [facility]       She has a history of Parkinson-like/neurologic deficit.    [facility] is indicated that she's having increased stiffness of legs hands. Increase unsteadiness in her gait with poor balance session when she indicates. They note that her left hand continues to have handling it is placed in C type physician during the day.      They're requesting a wheelchair.    Hypertension   patient with history of hypertension she takes Cozaar 50 mg once daily along with Norvasc 2.5 mg daily. And 20 mg of Lasix daily.    Final Impressions ECHO 8/5/15  1. Decreased left ventricular size, mildly increased wall thickness, normal global systolic function, calculated EF of 65 %.  2. Grade 1 pattern of LV diastolic filling.  3. Right ventricular cavity size is normal, global systolic RV function is normal.  4. The aortic valve is calcified, mild stenosis and no regurgitation.         anxiety depression  currently on Celexa 40 mg once daily    PHQ Depression Screen  Date of PHQ exam: 17  Over the last 2 weeks, how often have you been bothered by any of the following problems?  1. Little interest or pleasure in doing things: 3 - Nearly every day  2. Feeling down, depressed,  or hopeless: 3 - Nearly every day  3. Trouble falling or staying asleep, or sleeping too much: 3 - Nearly every day  4. Feeling tired or having little energy: 3 - Nearly every day  5. Poor appetite or overeating: 3 - Nearly every day  6. Feeling bad about yourself - or that you are a failure or have let yourself or your family down: 3 - Nearly every day  7. Trouble concentrating on things, such as reading the newspaper or watching television: 3 - Nearly every day  8. Moving or speaking so slowly that other people could have noticed. Or the opposite - being so fidgety or restless that you have been moving around a lot more than usual: 3 - Nearly every day  9. Thoughts that you would be better off dead, or of hurting yourself in some way: 0 - Not at all    PHQ-9 TOTAL SCORE: (!) 24  Depression Severity Level: severe  If any answers were positive, how difficult have these problems made it for you to do your work, take care of things at home, or get along with other people: extremely difficult        history of recurrent diarrhea  currently on 2 g of cholestyramine daily;  Doing well.     Insomnia  this is been chronic problem for patient. She has had troubles with sleep and trazodone does not seem to help at 50 mg.      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  ~    Manny Laws ---m 1958 , retired from [employer]    Siblings  Three sisters, no brothers      Four sons (Pawel dooley 1959; Jigar dooley 1960; Kai 1962; Avi dooley 1964) and     nine  grandchildren, 3 great grandchildren.                 Family Hx:   Family History       Problem   Relation Age of Onset     Cancer-breast  Mother       age 81 of breast cancer       Cancer  Father       age 96 of unknown type of cancer       Heart Disease  Father      Silent MI       Other  Father      Dementia       Diabetes  Sister      Osteoporosis  Sister      Osteoporosis  Sister      Heart Disease  Sister      Valve repair X2       Psychiatric illness  Son      Depression       Diabetes  Other      Psychiatric illness  Neg.      Negative for anxiety       Psychiatric illness  Neg.      Negative for Dementia       Cancer-colon  Neg.      Negative for Colon-Cancer         Objective:  Visit Vitals       /70     Pulse 80     Breastfeeding No   patient is alert sitting.   She is unable to walk or get up without assistance. Once she has a very wide gait and is unstable.  She has dry mouth.  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 It is able to move to neutral position but causes her discomfort. She does some lip smacking as well. patient is sitting in her walker. She holds her neck to the right. Lungs clear. CV regular 2-3/6 fran ; Lungs clear. Oriented times three. Abdomen is soft nontender extremities are without edema varicosities noted         Results for orders placed or performed in visit on 17      COMP METABOLIC PANEL      Result  Value Ref Range    SODIUM 131 (L) 133 - 143 mmol/L    POTASSIUM 4.4 3.5 - 5.1 mmol/L    CHLORIDE 99 98 - 107 mmol/L    CO2,TOTAL 25 21 - 31 mmol/L    ANION GAP 7 5 - 18                    GLUCOSE 107 (H) 70 - 105 mg/dL    CALCIUM 9.4 8.6 - 10.3 mg/dL    BUN 16 7 - 25 mg/dL    CREATININE 0.81 0.70 - 1.30 mg/dL    BUN/CREAT RATIO           20                    GFR if African American >60 >60 ml/min/1.73m2    GFR if not African American >60 >60 ml/min/1.73m2    ALBUMIN 3.0 (L) 3.5 - 5.7 g/dL    PROTEIN,TOTAL 6.5 6.4 - 8.9 g/dL    GLOBULIN                   3.5 2.0 - 3.7 g/dL    A/G RATIO 0.9 (L) 1.0 - 2.0                    BILIRUBIN,TOTAL 0.3 0.3 - 1.0 mg/dL    ALK PHOSPHATASE 111 (H) 34 - 104 IU/L    ALT (SGPT) 5 (L) 7 - 52 IU/L    AST (SGOT) 8 (L) 13 - 39 IU/L   CBC WITH AUTO DIFFERENTIAL      Result  Value Ref Range    WHITE BLOOD COUNT         10.2 4.5 - 11.0 thou/cu mm    RED BLOOD COUNT           3.38 (L) 4.00 - 5.20 mil/cu mm    HEMOGLOBIN                10.5 (L) 12.0 - 16.0 g/dL    HEMATOCRIT                30.8 (L) 33.0 - 51.0 %    MCV                       91 80 - 100 fL    MCH                       31.2 26.0 - 34.0 pg    MCHC                      34.2 32.0 - 36.0 g/dL    RDW                       11.2 (L) 11.5 - 15.5 %    PLATELET COUNT            330 140 - 440 thou/cu mm    MPV                       6.5 6.5 - 11.0 fL    NEUTROPHILS               71.8 42.0 - 72.0 %    LYMPHOCYTES               6.6 (L) 20.0 - 44.0 %    MONOCYTES                 11.5 <12.0 %    EOSINOPHILS               8.2 (H) <8.0 %    BASOPHILS                 1.8 <3.0 %    ABSOLUTE NEUTROPHILS      7.3 (H) 1.7 - 7.0 thou/cu mm    ABSOLUTE LYMPHOCYTES      0.7 (L) 0.9 - 2.9 thou/cu mm    ABSOLUTE MONOCYTES        1.2 (H) <0.9 thou/cu mm    ABSOLUTE EOSINOPHILS      0.8 (H) <0.5 thou/cu mm    ABSOLUTE BASOPHILS        0.2 <0.3 thou/cu mm           Assessment:    ICD-10-CM    1. Restless legs G25.81 primidone (MYSOLINE) 50 mg tablet      DISCONTINUED: pramipexole (MIRAPEX) 0.25 mg tablet   2. Neurosis, anxiety, generalized F41.1 citalopram (CELEXA) 40 mg tablet   3. Generalized weakness R53.1 medication order composer   4. Recurrent falls R29.6 medication order composer   5. Aortic valve stenosis, unspecified etiology I35.0    6. Hypertension I10 COMP METABOLIC PANEL      CBC AND DIFFERENTIAL      COMP METABOLIC PANEL      CBC AND DIFFERENTIAL      CBC WITH AUTO DIFFERENTIAL     I do believe patient is a candidate for a manual wheelchair due to her limitations in her mobility affecting all  of her activities of daily living. She will have significant improvement when she will have decreased risk of falls. She certainly may see physical therapy or occupational therapist.       Plan:   -- Expected clinical course discussed   -- Medications and their side effects discussed  Patient Instructions   1.  Labs will sent to your home  2.  Decrease lasix to every other day.   3.  If tolerate and/ or lab shows then will need to consider discontinuing  4.  Ear wax in right ear; - irrigated by nursing staff  5.  Stop mirapex  6.   Stop trazodone  7.  Okay to use 5 mg of melatonin a day.   8.  Wheel chair of chronic deconditioning  9.  Falls risk precautions  10.  Continue celexa for now. But will consider changing to remeron to help with anxiety, mood, sleep , appetitie  But would like to see how she does with adjustments /  off of lasix, trazodone, mirpex and added melatonin   11.  Recommend ensure/ carnation drink twice a day with meals.              Index German   Mirtazapine, Oral   [id number]  ________________________________________________________________________  KEY POINTS    This medicine is taken by mouth to treat depression. Take it exactly as directed.    This medicine may increase suicidal thoughts or actions in some people.    This medicine may cause unwanted side effects. Tell your healthcare provider if you have any side effects that are serious, continue, or get worse.    This medicine may cause life-threatening problems if you take this medicine with certain other medicines. Tell all healthcare providers who treat you about all the prescription medicines, nonprescription medicines, supplements, natural remedies, and vitamins that you take.  ________________________________________________________________________  What are other names for this medicine?   Type of medicine: antidepressant  Generic and brand names: mirtazapine, oral; Remeron; Remeron SolTab  What is this medicine used  for?  This medicine is taken by mouth to treat depression.  This medicine may be used to treat other conditions as determined by your healthcare provider.  What should my healthcare provider know before I take this medicine?   Before taking this medicine, tell your healthcare provider if you have ever had:    An allergic reaction to any medicine    A stroke or transient ischemic attack (TIA)    Glaucoma    Heart disease or a heart attack    High cholesterol or high triglycerides    High or low blood pressure    Liver or kidney disease    Low levels of sodium in the blood    Mental health problems such as bipolar disorder, paranoia, or schizophrenia    Phenylketonuria (PKU). The SolTab contains phenylalanine.    Seizures    Thoughts of suicide  Do not take this medicine if you have taken an MAO inhibitor such as isocarboxazid (Marplan), phenelzine (Nardil), selegiline, or tranylcypromine (Parnate) within the last 14 days.  Females of childbearing age: Tell your healthcare provider if you are pregnant or plan to become pregnant. It is not known whether this medicine will harm an unborn baby. Do not breast-feed while taking this medicine without your healthcare provider's approval.  How do I take it?   Read the Medication Guide that comes in the medicine package when you start taking this medicine and each time you get a refill.  Check the label on the medicine for directions about your specific dose. Take this medicine exactly as your healthcare provider prescribes. Do not take more of it or take it longer than prescribed. Do not stop taking this medicine without your healthcare provider's approval. You may have to reduce your dosage gradually. Stopping too quickly may cause withdrawal symptoms.  Check with your healthcare provider before using this medicine in children under age 18.  Usually you will take your daily dose at bedtime because this medicine may make you drowsy.  If you have the dissolving tablet form of  this medicine, open the blister pack with dry hands, place the tablet on your tongue immediately, and let the tablet dissolve. Water is NOT needed to take the tablet. Do not crush, break, or chew the dissolving tablet.  What if I miss a dose?  If you miss a dose, take it as soon as you remember unless it is almost time for the next scheduled dose. In that case, skip the missed dose and take the next one as directed. Do not take double doses. If you are not sure of what to do if you miss a dose, or if you miss more than one dose, contact your healthcare provider. Do not stop taking this medicine without your healthcare provider's approval. You may need to reduce your dose slowly to avoid withdrawal symptoms.  What if I overdose?  If you or anyone else has intentionally taken too much of this medicine, call 911 or go to the emergency room right away. If you pass out, have seizures, weakness or confusion, or have trouble breathing, call 911. If you think that you or anyone else may have taken too much of this medicine, call the poison control center. Do this even if there are no signs of discomfort or poisoning. The poison control center number is [phone].  Symptoms of an acute overdose may include: confusion, memory problems, drowsiness, fast heartbeat.  What should I watch out for?   Antidepressant medicines may increase suicidal thoughts or actions in some children, teenagers, and young adults within the first few months of treatment or if your dose changes. Talk with your provider about this.  Behavior changes may be caused by the medicine or by depression or another mental health problem. Contact your provider right away if you or your family notice any disturbing changes in your thoughts or behavior, such as:    More outgoing or aggressive behavior than normal    Confusion    Hallucinations    Worsening of depression    Suicidal thoughts  This medicine may trigger angle-closure glaucoma. Contact your  provider right away if you have eye pain, vision changes, or redness and swelling in or around your eye.  It may take several weeks before you start to feel better. Do not stop taking this medicine unless your healthcare provider tells you to do so. You may have withdrawal symptoms if you stop this medicine abruptly.  This medicine increases the effects of alcohol and other drugs that slow down your nervous system. Do not drink alcohol or take other medicines unless your healthcare provider approves.  This medicine may make you dizzy or drowsy or cause blurred vision. Do not drive or operate machinery unless you are fully alert.  You may feel dizzy or faint when you get up quickly after sitting or lying down. Getting up slowly may help.  If you need emergency care, surgery, lab tests, or dental work, tell the healthcare provider or dentist you are taking this medicine.  Adults over the age of 65 may be at greater risk for side effects. Talk with your healthcare provider about this.  This medicine may cause a life-threatening problem called serotonin syndrome if you take it with certain other medicines, such as antidepressants, migraine medicines, pain medicines, some cough medicines, and Vanderbilt s wort. Make sure that your providers know ALL of the medicines that you take. Contact your healthcare provider right away if you have:    Restlessness    Loss of coordination    Fast heart beat    Rapid changes in blood pressure    Increased body temperature    Nausea    Vomiting    Diarrhea  This medicine may increase your blood level of cholesterol or triglycerides. Talk to your healthcare provider about this.  What are the possible side effects?   Along with its needed effects, your medicine may cause some unwanted side effects. Some side effects may be very serious. Some side effects may go away as your body adjusts to the medicine. Tell your healthcare provider if you have any side effects that continue or get  worse.  Life-threatening (Report these to your healthcare provider right away. If you are unable to reach your healthcare provider right away, get emergency medical care or call 911 for help): Allergic reaction (hives; itching; rash; trouble breathing; tightness in your chest; swelling of your lips, tongue, and throat).  Serious (Report these to your healthcare provider right away.): Chest pain; chills; dark urine; fast or irregular heartbeat; hallucinations; heavy sweating; high fever; fainting; loss of bladder control; mouth sores; muscle or joint pain; new or sudden changes in mood, behaviors, thoughts, or feelings; numbness or tingling in the hands or feet; persistent headache; prolonged erection; rash; seizures; severe drowsiness; severe muscle stiffness; severe restlessness or panic attacks; thoughts of suicide; trouble concentrating or memory problems; trouble urinating; trouble walking or loss of balance; twitching or involuntary movement of your body or face; unexplained sore throat; unusual bruising or bleeding; unusual excitement; unusual tiredness or weakness; worsening depression; yellowing of your eyes or skin.  Other: Abnormal dreams; bloating; change in sense of taste; change in sexual ability or desire; constipation; diarrhea; mild dizziness; mild drowsiness; dry mouth; mild headache; increased appetite; nausea; trouble sleeping; vision problems; weight gain or loss.  What products might interact with this medicine?   When you take this medicine with other medicines, it can change the way this or any of the other medicines work. Nonprescription medicines, vitamins, natural remedies, and certain foods may also interact. Using these products together might cause harmful side effects. Talk to your healthcare provider if you are taking:    Abiraterone (Zytiga)    ACE inhibitors such as benazepril (Lotensin), captopril, enalapril (Vasotec), fosinopril, lisinopril (Prinivil, Zestril), moexipril (Univasc),  quinapril (Accupril), and ramipril (Altace)    Alpha blockers such as alfuzosin (Uroxatral), doxazosin (Cardura), prazosin (Minipress), silodosin (Rapaflo), tamsulosin (Flomax), and terazosin    Angiotensin receptor blockers (ARBs) such as azilsartan (Edarbi), candesartan (Atacand), eprosartan (Teveten), irbesartan (Avapro), losartan (Cozaar), olmesartan (Benicar), telmisartan (Micardis), and valsartan (Diovan)    Antianxiety medicines such as alprazolam (Xanax), chlordiazepoxide, clonazepam (Klonopin), diazepam (Valium), lorazepam (Ativan), and triazolam (Halcion)    Antiarrhythmic medicines (to treat irregular heartbeat) such as amiodarone (Cordarone, Pacerone), dronedarone (Multaq), mexiletine, and quinidine    Antibiotics such as azithromycin (Zithromax, Zmax), ciprofloxacin (Cipro), clarithromycin (Biaxin), erythromycin (E.E.S., Saleem-Tab, Erythrocin), isoniazid, levofloxacin (Levaquin), linezolid (Zyvox), metronidazole, moxifloxacin (Avelox), rifabutin (Mycobutin), rifampin (Rifadin), and telithromycin (Ketek)    Antidepressants such as amitriptyline, amoxapine, citalopram (Celexa), clomipramine (Anafranil), desipramine (Norpramin), desvenlafaxine (Pristiq), duloxetine (Cymbalta), escitalopram (Lexapro), fluoxetine (Prozac), fluvoxamine (Luvox), imipramine (Tofranil), levomilnacipran (Fetzima), nefazodone, nortriptyline (Pamelor), protriptyline (Vivactil), sertraline (Zoloft), trimipramine (Surmontil), venlafaxine (Effexor), vilazodone (Viibryd), and vortioxetine (Trintellix)    Antifungal medicines such as fluconazole (Diflucan), itraconazole (Sporanox), ketoconazole (Nizoral), posaconazole (Noxafil), and voriconazole (Vfend)    Antihistamines such as chlorpheniramine (Chlor-Trimeton), clemastine (Tavist), diphenhydramine (Benadryl), and hydroxyzine (Vistaril)    Antipsychotic medicines such as aripiprazole (Abilify), asenapine (Saphris), brexpiprazole (Rexulti), chlorpromazine, clozapine (Clozaril, FazaClo),  fluphenazine, haloperidol (Haldol), iloperidone (Fanapt), loxapine (Loxitane), lurasidone (Latuda), olanzapine (Zyprexa), paliperidone (Invega), perphenazine, pimozide (Orap), quetiapine (Seroquel), risperidone (Risperdal), thioridazine, trifluoperazine, and ziprasidone (Geodon)    Antiseizure medicines such as carbamazepine (Carbatrol, Epitol, Equetro, Tegretol), felbamate (Felbatol), gabapentin (Neurontin), lamotrigine (Lamictal), levetiracetam (Keppra), oxcarbazepine (Trileptal), phenytoin (Dilantin, Phenytek), primidone (Mysoline), tiagabine (Gabitril), topiramate (Qudexy, Topamax, Trokendi), and valproic acid (Depacon, Depakene, Depakote)    Barbiturates such as butabarbital (Butisol), pentobarbital (Nembutal), phenobarbital, and secobarbital (Seconal)    Beta blockers such as acebutolol (Sectral), atenolol (Tenormin), bisoprolol (Zebeta), carvedilol (Coreg), labetalol (Trandate), metoprolol (Lopressor, Toprol), nadolol (Corgard), nebivolol (Bystolic), pindolol, and sotalol (Betapace, Sorine)    Bosentan (Tracleer)    Bupropion (Aplenzin, Forfivo, Wellbutrin, Buproban, Zyban)    Buspirone    Calcium channel blockers such as amlodipine (Norvasc), amlodipine/atorvastatin (Caduet), diltiazem (Cardizem, Cartia, Tiazac), felodipine, isradipine (DynaCirc), nicardipine (Cardene), nifedipine (Adalat CC, Procardia), nisoldipine (Sular), and verapamil (Calan, Covera, Verelan)    Cimetidine (Tagamet)    Cough, cold, or allergy medicines and decongestants    Cyclosporine (Gengraf, Neoral, Sandimmune)    Dextromethorphan, an ingredient in many cough, cold, or allergy medicines such as Robitussin-DM    Dextromethorphan/quinidine (Nuedexta)    Doxepin (Silenor)    HIV medicines such as atazanavir (Reyataz), darunavir (Prezista), delavirdine (Rescriptor), efavirenz (Sustiva), emtricitabine (Emtriva), etravirine (Intelence), fosamprenavir (Lexiva), indinavir (Crixivan), lopinavir/ritonavir (Kaletra), maraviroc (Selzentry),  nelfinavir (Viracept), nevirapine (Viramune), rilpivirine (Edurant), ritonavir (Norvir), saquinavir (Invirase), stavudine (Zerit), and tipranavir (Aptivus)    Imatinib (Gleevec)    Lithium (Lithobid)    MAO inhibitors such as isocarboxazid (Marplan), phenelzine (Nardil), selegiline (Eldepryl, Emsam, Zelapar), and tranylcypromine (Parnate) (Do not take this medicine and an MAO inhibitor within 14 days of each other.)    Medicines to treat low sodium levels such as conivaptan (Vaprisol) and tolvaptan (Samsca)    Metoclopramide (Metozolv, Reglan)    Migraine medicines such as almotriptan (Axert), dihydroergotamine (D.H.[address]. [address], Migranal), eletriptan (Relpax), ergotamine (Ergomar), frovatriptan (Frova), naratriptan (Amerge), rizatriptan (Maxalt), sumatriptan (Alsuma, Imitrex, Sumavel), and zolmitriptan (Zomig)    Milnacipran (Savella)    Muscle relaxants such as baclofen (Gablofen, Lioresal), carisoprodol (Soma), cyclobenzaprine (Amrix), dantrolene (Dantrium), methocarbamol (Robaxin), and tizanidine (Zanaflex)    Natural remedies such as gotu kathryn, kava, Jem's wort, SAMe, tryptophan, and valerian    Nausea medicines such as dolasetron (Anzemet), droperidol (Inapsine), ondansetron (Zofran), prochlorperazine (Compro), and promethazine    Pain medicines such as codeine, fentanyl (Abstral, Actiq, Duragesic, Fentora, Sublimaze), hydrocodone/acetaminophen (Norco, Vicodin), hydromorphone (Dilaudid, Exalgo), meperidine (Demerol), methadone (Dolophine, Methadose), morphine (Omayra, MS Contin), morphine/naltrexone (Embeda), oxycodone (OxyContin, Roxicodone), oxycodone/acetaminophen (Percocet, Roxicet), pentazocine (Talwin), tapentadol (Nucynta), and tramadol (ConZip, Ultram)    Parkinson s disease medicines such as bromocriptine (Cycloset, Parlodel), cabergoline, levodopa/carbidopa (Duopa, Rytary, Sinemet), entacapone (Comtan), pramipexole (Mirapex), rasagiline (Azilect), and ropinirole (Requip)    Paroxetine (Brisdelle,  Paxil, Pexeva)    Procarbazine (Matulane)    Products that contain methylene blue (Hyophen, Prosed DS, Urophen, Misty)    Propranolol (Hemangeol, Inderal, InnoPran)    Sleeping pills such as eszopiclone (Lunesta), zaleplon (Sonata), and zolpidem (Ambien, Edluar, Intermezzo)    Warfarin (Coumadin)  Ask your healthcare provider or pharmacist if you need to avoid products that contain grapefruit, [place] oranges, and tangelos while you are taking this medicine. These fruits and juices can affect the way this medicine works and may increase your risk of serious side effects.  Do not drink alcohol while you are taking this medicine.  If you are not sure if your medicines might interact, ask your pharmacist or healthcare provider. Keep a list of all your medicines with you. List all the prescription medicines, nonprescription medicines, supplements, natural remedies, and vitamins that you take. Be sure that you tell all healthcare providers who treat you about all the products you are taking.   How should I store this medicine?  Store this medicine at room temperature. Keep the container tightly closed. Protect it from heat, high humidity, and bright light.    This advisory includes selected information only and may not include all side effects of this medicine or interactions with other medicines. Ask your healthcare provider or pharmacist for more information or if you have any questions.  Ask your pharmacist for the best way to dispose of outdated medicine or medicine you have not used. Do not throw medicine in the trash.  Keep all medicines out of the reach of children.   Do not share medicines with other people.   Developed by [employer].  Medication Advisor 2016.3 published by [employer].  Last modified: 2016-07-28  Last reviewed: 2016-01-11  This content is reviewed periodically and is subject to change as new health information becomes available. The information is intended to inform and educate and is not a  replacement for medical evaluation, advice, diagnosis or treatment by a healthcare professional.  References   Medication Advisor 2016.3 Index    Copyright   2016 [employer], a division of [employer] All rights reserved.             Electronically signed by Esme Zarco MD

## 2018-01-04 NOTE — NURSING NOTE
Patient Information     Patient Name MRN Sex Melita Maldonado [id number] Female 1927      Nursing Note by Claudia Medeiros at 3/28/2017 10:15 AM     Author:  Claudia Medeiros Service:  (none) Author Type:  (none)     Filed:  3/28/2017 11:13 AM Encounter Date:  3/28/2017 Status:  Signed     :  Claudia Medeiros            Medication management and Wheel Chair order needed  Claudia Medeiros LPN........................3/28/2017  10:42 AM

## 2018-01-05 NOTE — TELEPHONE ENCOUNTER
Patient Information     Patient Name MRN Sex Melita Maldonado [id number] Female 1927      Telephone Encounter by Leigh Rockwell RN at 2017 11:11 AM     Author:  Leigh Rockwell RN Service:  (none) Author Type:  (none)     Filed:  2017 11:20 AM Encounter Date:  2017 Status:  Signed     :  Leigh Rockwell RN (NURS- Registered Nurse)            Diuretics (may be prescribed for edema)    Office visit in the past 12 months or per provider note.    Last visit with PETR KURTZ was on: 2017 in [email or web address]  Next visit with PETR KURTZ is on: No future appointment listed with this provider  Next visit with Family Practice is on: No future appointment listed in this department    Lab testing requirements:  Creatinine and Potassium annually, if ordering lab, order BMP.  CREATININE (mg/dL)    Date Value   2017 0.81     POTASSIUM (mmol/L)    Date Value   2017 4.4     BP Readings from Last 4 Encounters:    17 159/77   17 116/70   17 144/76   16 120/80       Review last provider visit note.  If BP reviewed and plan is noted, can refill.  Max refill for 12 months from last office visit or per provider note.    Per last office visit note - decrease lasix to every other day. This was updated in chart to reflect this. Short refill given as patient is being closely followed by PCP.    Prescription refilled per RN Medication Refill Policy.................... Leigh Rockwell ....................  2017   11:19 AM

## 2018-01-05 NOTE — TELEPHONE ENCOUNTER
Patient Information     Patient Name MRN Melita Hu [id number] Female 1927      Telephone Encounter by Claudia Medeiros at 2017  8:42 AM     Author:  Claudia Medeiros Service:  (none) Author Type:  (none)     Filed:  2017 11:34 AM Encounter Date:  2017 Status:  Signed     :  Claudia Medeiros            Incoming fax wanting to verify med list.    Med list updated.   Claudia Medeiros LPN........................2017  11:34 AM

## 2018-01-05 NOTE — TELEPHONE ENCOUNTER
Patient Information     Patient Name MRN Melita Hu [id number] Female 1927      Telephone Encounter by Leigh Rockwell RN at 2017  1:51 PM     Author:  Leigh Rockwell RN Service:  (none) Author Type:  NURS- Registered Nurse     Filed:  2017  2:15 PM Encounter Date:  2017 Status:  Signed     :  Leigh Rockwell RN (NURS- Registered Nurse)            This is a Refill request from: [facility]   Name of Medication: Trazodone    Patient is no longer on trazodone - was discontinued 17. Refill refused.     Unable to complete prescription refill per RN Medication Refill Policy.................... Leigh Rockwell RN ....................  2017   1:51 PM

## 2018-01-05 NOTE — TELEPHONE ENCOUNTER
Patient Information     Patient Name MRN Melita Hu [id number] Female 1927      Telephone Encounter by Leigh Rockwell RN at 2017  1:50 PM     Author:  Leigh Rockwell RN Service:  (none) Author Type:  NURS- Registered Nurse     Filed:  2017  2:15 PM Encounter Date:  2017 Status:  Signed     :  Leigh Rockwell RN (NURS- Registered Nurse)            Cholesterol    Office visit in the past 12 months.    Last visit with PETR KURTZ was on: 2017 in [facility]  Next visit with PETR KURTZ is on: No future appointment listed with this provider  Next visit with Family Practice is on: No future appointment listed in this department    Lab testing requirements:  Lipids annually.  Repeat lipids 6-8 weeks after dosage or drug change.    Last Lipids:  Chol: 177    2015  T    2015  HDL:   85    2015  LDL:  68    2015  LDL DIRECT:  No results found in past 5 years    .    Max refills 12 months from last office visit.      Prescription refilled per RN Medication Refill Policy.................... Leigh Rockwell RN ....................  2017   1:51 PM

## 2018-01-31 PROBLEM — K57.32 DIVERTICULITIS OF COLON: Status: ACTIVE | Noted: 2018-01-01

## 2018-01-31 PROBLEM — F01.50 VASCULAR DEMENTIA WITHOUT BEHAVIORAL DISTURBANCE (H): Status: ACTIVE | Noted: 2017-01-01

## 2018-01-31 PROBLEM — I10 HYPERTENSION: Status: ACTIVE | Noted: 2018-01-01

## 2018-01-31 PROBLEM — E11.9 DIABETES MELLITUS, TYPE II (H): Status: ACTIVE | Noted: 2018-01-01

## 2018-01-31 PROBLEM — M89.9 DISORDER OF BONE AND CARTILAGE: Status: ACTIVE | Noted: 2018-01-01

## 2018-01-31 PROBLEM — M94.9 DISORDER OF BONE AND CARTILAGE: Status: ACTIVE | Noted: 2018-01-01

## 2018-03-23 NOTE — TELEPHONE ENCOUNTER
Vanessa stated that she sent a fax over as well but the patient had vomited a week ago and since then hasn't been able to swallow very well. Stated that she is not eating/drinking much and is only taking small sips from a spoon. Stated that she discussed with the family about options and they are possibly thinking hospice. Stated that she does not think that they are going to want to bring her in to the ED to be evaluated. Stated that she will let the family know that Jenn VINSON is out of office until Monday and offer the ED if they would like.  Ivonne Gabriel LPN.......3/23/2018.......1:07 PM

## 2018-03-26 NOTE — TELEPHONE ENCOUNTER
I let him know that the order was approved and this was faxed to hospice earlier this afternoon. I did ask him to let us know if there are any issues.   Ivy Paris LPN...................3/26/2018   3:33 PM

## 2018-03-26 NOTE — TELEPHONE ENCOUNTER
Son called and is requesting hospice services for this patient. She is currently at [facility], and family and himself have dicussed this in depth, and feel the need for the services.     I called [facility] to get a little more information, and they report that they and family have noticed a great decline in patient function; patient is not eating, swallowing, and is hardly drinking fluids. She does tend to choke when she does try to swallow anything. Non-verbal or communicative, not responsive to any stimuli, and is not taking her medication (since she is unable). 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   She has it worst in her neck and shoulders. She has been at [facility] for about a year.    Son is wondering if you can place referral for hospice, and does not want to wait until PCP is back in the am. Order pending if appropriate. Please advise. Did add primary diagnosis of spasmodic torticollis-if this is ok to use.  Ivy Paris LPN...................3/26/2018   12:46 PM

## 2018-03-29 ENCOUNTER — MEDICAL CORRESPONDENCE (OUTPATIENT)
Dept: HEALTH INFORMATION MANAGEMENT | Facility: OTHER | Age: 83
End: 2018-03-29

## 2018-07-23 NOTE — PROGRESS NOTES
Patient Information     Patient Name  Melita Laws MRN  [id number] Sex  Female   1927      Letter by Esme Zarco MD at      Author:  Esme Zarco MD Service:  (none) Author Type:  (none)    Filed:   Encounter Date:  3/10/2017 Status:  (Other)           Melita Laws  [address] [facility] [address]          2017    Dear Ms. Laws:    A LIMITED refill of pramipexole (MIRAPEX) 0.25 mg tablet has been called into your pharmacy.    Additional refills require a medication management/3 month follow-up appointment with Esme Zarco MD as discussed at last appointment. Please call the clinic at [phone] to schedule your appointment.    Thank you,    The Refill Nurse  [facility]

## 2018-07-23 NOTE — PROGRESS NOTES
Patient Information     Patient Name  Melita Laws MRN  [id number] Sex  Female   1927      Letter by Esme Zarco MD at      Author:  Esme Zarco MD Service:  (none) Author Type:  (none)    Filed:   Encounter Date:  3/28/2017 Status:  (Other)           Melita Laws  [address]  [facility] [address]          2017    Dear Ms. Laws:    Following are the tests completed during your last clinic visit.  The results of these tests are normal and require no further attention unless otherwise noted.    Results for orders placed or performed in visit on 17      COMP METABOLIC PANEL      Result  Value Ref Range    SODIUM 131 (L) 133 - 143 mmol/L    POTASSIUM 4.4 3.5 - 5.1 mmol/L    CHLORIDE 99 98 - 107 mmol/L    CO2,TOTAL 25 21 - 31 mmol/L    ANION GAP 7 5 - 18                    GLUCOSE 107 (H) 70 - 105 mg/dL    CALCIUM 9.4 8.6 - 10.3 mg/dL    BUN 16 7 - 25 mg/dL    CREATININE 0.81 0.70 - 1.30 mg/dL    BUN/CREAT RATIO           20                    GFR if African American >60 >60 ml/min/1.73m2    GFR if not African American >60 >60 ml/min/1.73m2    ALBUMIN 3.0 (L) 3.5 - 5.7 g/dL    PROTEIN,TOTAL 6.5 6.4 - 8.9 g/dL    GLOBULIN                  3.5 2.0 - 3.7 g/dL    A/G RATIO 0.9 (L) 1.0 - 2.0                    BILIRUBIN,TOTAL 0.3 0.3 - 1.0 mg/dL    ALK PHOSPHATASE 111 (H) 34 - 104 IU/L    ALT (SGPT) 5 (L) 7 - 52 IU/L    AST (SGOT) 8 (L) 13 - 39 IU/L   CBC WITH AUTO DIFFERENTIAL      Result  Value Ref Range    WHITE BLOOD COUNT         10.2 4.5 - 11.0 thou/cu mm    RED BLOOD COUNT           3.38 (L) 4.00 - 5.20 mil/cu mm    HEMOGLOBIN                10.5 (L) 12.0 - 16.0 g/dL    HEMATOCRIT                30.8 (L) 33.0 - 51.0 %    MCV                       91 80 - 100 fL    MCH                       31.2 26.0 - 34.0 pg    MCHC                      34.2 32.0 - 36.0 g/dL    RDW                       11.2 (L) 11.5 - 15.5 %    PLATELET COUNT            330  140 - 440 thou/cu mm    MPV                       6.5 6.5 - 11.0 fL    NEUTROPHILS               71.8 42.0 - 72.0 %    LYMPHOCYTES               6.6 (L) 20.0 - 44.0 %    MONOCYTES                 11.5 <12.0 %    EOSINOPHILS               8.2 (H) <8.0 %    BASOPHILS                 1.8 <3.0 %    ABSOLUTE NEUTROPHILS      7.3 (H) 1.7 - 7.0 thou/cu mm    ABSOLUTE LYMPHOCYTES      0.7 (L) 0.9 - 2.9 thou/cu mm    ABSOLUTE MONOCYTES        1.2 (H) <0.9 thou/cu mm    ABSOLUTE EOSINOPHILS      0.8 (H) <0.5 thou/cu mm    ABSOLUTE BASOPHILS        0.2 <0.3 thou/cu mm     recommend patient have ensure/ Cocoa drink with meals twice a day. Can liberalize sodium.    If you have any further questions or problems contact my office at the above number.  I trust this finds Melita in  improved health    Highest Regards,        Electronically signed by Esme Zarco MD

## 2018-07-23 NOTE — PROGRESS NOTES
Patient Information     Patient Name  Melita Laws MRN  [id number] Sex  Female   1927      Letter by Esme Zarco MD at      Author:  Esme Zarco MD Service:  (none) Author Type:  (none)    Filed:   Encounter Date:  2017 Status:  (Other)           Melita Laws  [address] [facility] [address]          2017    Dear Ms. Laws:    A LIMITED refill of  losartan (COZAAR) 100 mg tablet has been called into your pharmacy.    Additional refills require a medication management appointment with Esme Zarco MD. Please call the clinic at [phone] to schedule your appointment.    Thank you,    The Refill Nurse  [facility]